# Patient Record
Sex: MALE | Race: WHITE | Employment: FULL TIME | URBAN - METROPOLITAN AREA
[De-identification: names, ages, dates, MRNs, and addresses within clinical notes are randomized per-mention and may not be internally consistent; named-entity substitution may affect disease eponyms.]

---

## 2021-12-18 ENCOUNTER — APPOINTMENT (OUTPATIENT)
Dept: GENERAL RADIOLOGY | Age: 61
End: 2021-12-18
Payer: COMMERCIAL

## 2021-12-18 ENCOUNTER — HOSPITAL ENCOUNTER (EMERGENCY)
Age: 61
Discharge: HOME | End: 2021-12-18
Payer: COMMERCIAL

## 2021-12-18 VITALS
BODY MASS INDEX: 36.37 KG/M2 | DIASTOLIC BLOOD PRESSURE: 97 MMHG | SYSTOLIC BLOOD PRESSURE: 149 MMHG | OXYGEN SATURATION: 92 % | RESPIRATION RATE: 18 BRPM | TEMPERATURE: 98.2 F | HEIGHT: 68 IN | HEART RATE: 99 BPM | WEIGHT: 240 LBS
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
## 2021-12-18 DIAGNOSIS — M25.512 ACUTE PAIN OF LEFT SHOULDER: ICD-10-CM

## 2021-12-18 PROCEDURE — 73030 X-RAY EXAM OF SHOULDER: CPT

## 2021-12-18 PROCEDURE — 6370000000 HC RX 637 (ALT 250 FOR IP)

## 2021-12-18 PROCEDURE — 99285 EMERGENCY DEPT VISIT HI MDM: CPT

## 2021-12-18 RX ORDER — IBUPROFEN 800 MG/1
800 TABLET ORAL ONCE
Status: COMPLETED | OUTPATIENT
Start: 2021-12-18 | End: 2021-12-18

## 2021-12-18 RX ORDER — IBUPROFEN 800 MG/1
TABLET ORAL
Status: COMPLETED
Start: 2021-12-18 | End: 2021-12-18

## 2021-12-18 RX ADMIN — IBUPROFEN 800 MG: 800 TABLET ORAL at 20:04

## 2021-12-18 RX ADMIN — IBUPROFEN 800 MG: 800 TABLET, FILM COATED ORAL at 20:04

## 2021-12-18 ASSESSMENT — ENCOUNTER SYMPTOMS
SHORTNESS OF BREATH: 0
NAUSEA: 0
CHEST TIGHTNESS: 0
DIARRHEA: 0
VOMITING: 0
ABDOMINAL PAIN: 0

## 2021-12-18 ASSESSMENT — PAIN SCALES - GENERAL
PAINLEVEL_OUTOF10: 4
PAINLEVEL_OUTOF10: 2

## 2021-12-18 NOTE — Clinical Note
Suzan Rey was seen and treated in our emergency department on 12/18/2021. He may return to work on 12/18/2021. Must wear sling until seen by orthopedic referral     If you have any questions or concerns, please don't hesitate to call.       Mehul Morel, APRN - CNP

## 2021-12-19 NOTE — ED PROVIDER NOTES
[facility]        Pt Name: Carlos Moore  MRN: [id number]  [place] 1960  Date of evaluation: 12/18/2021  Provider: KAMILA Canas CNP  PCP: No primary care provider on file. Note Started: 7:04 PM EST       MIGUEL. I have evaluated this patient. My supervising physician was available for consultation. CHIEF COMPLAINT       Chief Complaint   Patient presents with    Shoulder Injury     pt with left should dislocation, hit by door, deformity noted       HISTORY OF PRESENT ILLNESS   (Location, Timing/Onset, Context/Setting, Quality, Duration, Modifying Factors, Severity, Associated Signs and Symptoms)  Note limiting factors. Chief Complaint: left shoulder dislocation     Carlos Moore is a 63 y.o. male who presents to the emergency department with concern of left shoulder dislocation. The patient reports that he was at work standing by a door when employee accidentally/aggressively came through the door, swinging it directly into his left shoulder. Patient appears to have an anterior shoulder dislocation with decreased range of joint motion. He has normal sensation to his fingers. Describes a mild throbbing pain, 4/10. 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 Reports \"is not that bad\". Denies any headache, fever, lightheadedness, dizziness, visual disturbances. No chest pain or pressure. No neck or back pain. No shortness of breath, cough, or congestion. No abdominal pain, nausea, vomiting, diarrhea, constipation, or dysuria. No rash. Nursing Notes were all reviewed and agreed with or any disagreements were addressed in the HPI. REVIEW OF SYSTEMS    (2-9 systems for level 4, 10 or more for level 5)     Review of Systems   Constitutional: Negative for activity change, chills and fever.    Respiratory: Negative for chest tightness and shortness of breath. Cardiovascular: Negative for chest pain. Gastrointestinal: Negative for abdominal pain, diarrhea, nausea and vomiting. Genitourinary: Negative for dysuria. Musculoskeletal:        Left shoulder dislocation   All other systems reviewed and are negative. Positives and Pertinent negatives as per HPI. Except as noted above in the ROS, all other systems were reviewed and negative. PAST MEDICAL HISTORY   History reviewed. No pertinent past medical history. SURGICAL HISTORY   History reviewed. No pertinent surgical history. CURRENTMEDICATIONS       There are no discharge medications for this patient. ALLERGIES     Patient has no known allergies. FAMILYHISTORY     History reviewed. No pertinent family history. SOCIAL HISTORY       Social History     Tobacco Use    Smoking status: Former Smoker     Types: Cigarettes     Quit date:      Years since quittin.9    Smokeless tobacco: Never Used   Vaping Use    Vaping Use: Never used   Substance Use Topics    Alcohol use: Not Currently    Drug use: Not Currently       SCREENINGS    Graciela Coma Scale  Eye Opening: Spontaneous  Best Verbal Response: Oriented  Best Motor Response: Obeys commands  Graciela Coma Scale Score: 15        PHYSICAL EXAM    (up to 7 for level 4, 8 or more for level 5)     ED Triage Vitals [21 1838]   BP Temp Temp Source Pulse Resp SpO2 Height Weight   (!) 148/87 98.2 °F (36.8 °C) Oral 102 18 95 % 5' 8\" (1.727 m) 240 lb (108.9 kg)       Physical Exam  Vitals and nursing note reviewed. Constitutional:       Appearance: He is well-developed. He is not diaphoretic. HENT:      Head: Normocephalic and atraumatic. Right Ear: External ear normal.      Left Ear: External ear normal.   Eyes:      General:         Right eye: No discharge. Left eye: No discharge. Neck:      Vascular: No JVD. Cardiovascular:      Rate and Rhythm: Normal rate and regular rhythm.       Pulses: Normal pulses. Heart sounds: Normal heart sounds. Pulmonary:      Effort: Pulmonary effort is normal. No respiratory distress. Breath sounds: Normal breath sounds. Abdominal:      Palpations: Abdomen is soft. Musculoskeletal:      Right shoulder: Normal.      Left shoulder: Deformity present. Decreased range of motion. Cervical back: Normal range of motion and neck supple. Skin:     General: Skin is warm and dry. Coloration: Skin is not pale. Neurological:      Mental Status: He is alert and oriented to person, place, and time. Psychiatric:         Behavior: Behavior normal.         DIAGNOSTIC RESULTS   LABS:    Labs Reviewed - No data to display    When ordered only abnormal lab results are displayed. All other labs were within normal range or not returned as of this dictation. EKG: When ordered, EKG's are interpreted by the Emergency Department Physician in the absence of a cardiologist.  Please see their note for interpretation of EKG. RADIOLOGY:   Non-plain film images such as CT, Ultrasound and MRI are read by the radiologist. Plain radiographic images are visualized and preliminarily interpreted by the ED Provider with the below findings:        Interpretation per the Radiologist below, if available at the time of this note:    XR SHOULDER LEFT (MIN 2 VIEWS)   Final Result   Addendum 1 of 1   ADDENDUM:   No left shoulder dislocation was evident. Final   Osteopenia without fracture, left AC joint separation or definite dislocation      The left acromial humeral distance was markedly narrowed. No results found.         PROCEDURES   Unless otherwise noted below, none     Procedures    CRITICAL CARE TIME   N/A    CONSULTS:  None      EMERGENCY DEPARTMENT COURSE and DIFFERENTIAL DIAGNOSIS/MDM:   Vitals:    Vitals:    12/18/21 1838 12/18/21 1845 12/18/21 1900 12/18/21 2001   BP: (!) 148/87 (!) 142/89 134/85 (!) 149/97   Pulse: 102 106 108 99   Resp: 18 Temp: 98.2 °F (36.8 °C)      TempSrc: Oral      SpO2: 95% 96% 96% 92%   Weight: 240 lb (108.9 kg)      Height: 5' 8\" (1.727 m)          Patient was given the following medications:  Medications   ibuprofen (ADVIL;MOTRIN) tablet 800 mg (800 mg Oral Given 12/18/21 2004)           Briefly, this is a 63year old male that presents with suspected left shoulder dislocation. He did have decreased range of joint motion upon arrival.    XR SHOULDER LEFT (MIN 2 VIEWS) (Edited Result - FINAL)  Result time 12/18/21 20:03:10  Addendum 1 of 1 by Berto Wagner (12/18/21 20:03:10)    ADDENDUM:  No left shoulder dislocation was evident.               Final result by Berto Wagner (12/18/21 19:39:20)                Impression:    Osteopenia without fracture, left AC joint separation or definite dislocation     The left acromial humeral distance was markedly narrowed. I did call for addendum x-ray read as it was confusing. Addendum did report that there is no left shoulder dislocation. The patient is placed in a sling and instructed to follow-up with orthopedics, ibuprofen for pain. He is comfortable with this solution. He is able to touch left hand over the right shoulder without difficulty and raise the left arm. I estimate there is LOW risk for FRACTURE, COMPARTMENT SYNDROME, DEEP VENOUS THROMBOSIS, SEPTIC ARTHRITIS, TENDON OR NEUROVASCULAR INJURY, thus I consider the discharge disposition reasonable. FINAL IMPRESSION      1. Work related injury    2. Acute pain of left shoulder          DISPOSITION/PLAN   DISPOSITION Decision To Discharge 12/18/2021 08:10:11 PM      PATIENT REFERRED TO:  Jose Elena MD  [address] [place] [address]  [phone]    Schedule an appointment as soon as possible for a visit         DISCHARGE MEDICATIONS:  There are no discharge medications for this patient. DISCONTINUED MEDICATIONS:  There are no discharge medications for this patient. (Please note that portions of this note were completed with a voice recognition program.  Efforts were made to edit the dictations but occasionally words are mis-transcribed.)    KAMILA Wolf CNP (electronically signed)            KAMILA Wolf CNP  12/18/21 [id number]